# Patient Record
Sex: MALE | Race: AMERICAN INDIAN OR ALASKA NATIVE | NOT HISPANIC OR LATINO | URBAN - METROPOLITAN AREA
[De-identification: names, ages, dates, MRNs, and addresses within clinical notes are randomized per-mention and may not be internally consistent; named-entity substitution may affect disease eponyms.]

---

## 2017-10-12 ENCOUNTER — INPATIENT (INPATIENT)
Facility: HOSPITAL | Age: 57
LOS: 3 days | Discharge: HOME | DRG: 726 | End: 2017-10-16
Attending: INTERNAL MEDICINE | Admitting: INTERNAL MEDICINE
Payer: COMMERCIAL

## 2017-10-12 VITALS
WEIGHT: 136.03 LBS | SYSTOLIC BLOOD PRESSURE: 164 MMHG | RESPIRATION RATE: 18 BRPM | DIASTOLIC BLOOD PRESSURE: 87 MMHG | HEIGHT: 65 IN | HEART RATE: 88 BPM | OXYGEN SATURATION: 99 % | TEMPERATURE: 98 F

## 2017-10-12 DIAGNOSIS — R33.9 RETENTION OF URINE, UNSPECIFIED: ICD-10-CM

## 2017-10-12 DIAGNOSIS — Z29.9 ENCOUNTER FOR PROPHYLACTIC MEASURES, UNSPECIFIED: ICD-10-CM

## 2017-10-12 DIAGNOSIS — R79.89 OTHER SPECIFIED ABNORMAL FINDINGS OF BLOOD CHEMISTRY: ICD-10-CM

## 2017-10-12 DIAGNOSIS — D64.9 ANEMIA, UNSPECIFIED: ICD-10-CM

## 2017-10-12 DIAGNOSIS — I10 ESSENTIAL (PRIMARY) HYPERTENSION: ICD-10-CM

## 2017-10-12 DIAGNOSIS — N17.9 ACUTE KIDNEY FAILURE, UNSPECIFIED: ICD-10-CM

## 2017-10-12 DIAGNOSIS — E87.1 HYPO-OSMOLALITY AND HYPONATREMIA: ICD-10-CM

## 2017-10-12 LAB
ALBUMIN SERPL ELPH-MCNC: 3.6 G/DL — SIGNIFICANT CHANGE UP (ref 3.5–5)
ALP SERPL-CCNC: 77 U/L — SIGNIFICANT CHANGE UP (ref 40–120)
ALT FLD-CCNC: 38 U/L DA — SIGNIFICANT CHANGE UP (ref 10–60)
ANION GAP SERPL CALC-SCNC: 10 MMOL/L — SIGNIFICANT CHANGE UP (ref 5–17)
ANION GAP SERPL CALC-SCNC: 7 MMOL/L — SIGNIFICANT CHANGE UP (ref 5–17)
APPEARANCE UR: CLEAR — SIGNIFICANT CHANGE UP
AST SERPL-CCNC: 25 U/L — SIGNIFICANT CHANGE UP (ref 10–40)
BILIRUB SERPL-MCNC: 0.6 MG/DL — SIGNIFICANT CHANGE UP (ref 0.2–1.2)
BILIRUB UR-MCNC: NEGATIVE — SIGNIFICANT CHANGE UP
BUN SERPL-MCNC: 20 MG/DL — HIGH (ref 7–18)
BUN SERPL-MCNC: 21 MG/DL — HIGH (ref 7–18)
CALCIUM SERPL-MCNC: 8.4 MG/DL — SIGNIFICANT CHANGE UP (ref 8.4–10.5)
CALCIUM SERPL-MCNC: 8.6 MG/DL — SIGNIFICANT CHANGE UP (ref 8.4–10.5)
CHLORIDE SERPL-SCNC: 103 MMOL/L — SIGNIFICANT CHANGE UP (ref 96–108)
CHLORIDE SERPL-SCNC: 109 MMOL/L — HIGH (ref 96–108)
CHLORIDE UR-SCNC: 43 MMOL/L — LOW (ref 55–125)
CO2 SERPL-SCNC: 19 MMOL/L — LOW (ref 22–31)
CO2 SERPL-SCNC: 22 MMOL/L — SIGNIFICANT CHANGE UP (ref 22–31)
COLOR SPEC: YELLOW — SIGNIFICANT CHANGE UP
CREAT ?TM UR-MCNC: 20 MG/DL — SIGNIFICANT CHANGE UP
CREAT SERPL-MCNC: 2.65 MG/DL — HIGH (ref 0.5–1.3)
CREAT SERPL-MCNC: 3.12 MG/DL — HIGH (ref 0.5–1.3)
DIFF PNL FLD: NEGATIVE — SIGNIFICANT CHANGE UP
GLUCOSE SERPL-MCNC: 100 MG/DL — HIGH (ref 70–99)
GLUCOSE SERPL-MCNC: 107 MG/DL — HIGH (ref 70–99)
GLUCOSE UR QL: NEGATIVE — SIGNIFICANT CHANGE UP
HCT VFR BLD CALC: 34.5 % — LOW (ref 39–50)
HGB BLD-MCNC: 11 G/DL — LOW (ref 13–17)
KETONES UR-MCNC: NEGATIVE — SIGNIFICANT CHANGE UP
LEUKOCYTE ESTERASE UR-ACNC: NEGATIVE — SIGNIFICANT CHANGE UP
MCHC RBC-ENTMCNC: 24.5 PG — LOW (ref 27–34)
MCHC RBC-ENTMCNC: 32 GM/DL — SIGNIFICANT CHANGE UP (ref 32–36)
MCV RBC AUTO: 76.8 FL — LOW (ref 80–100)
NITRITE UR-MCNC: NEGATIVE — SIGNIFICANT CHANGE UP
OSMOLALITY UR: 136 MOS/KG — SIGNIFICANT CHANGE UP (ref 50–1200)
PH UR: 5 — SIGNIFICANT CHANGE UP (ref 5–8)
PLATELET # BLD AUTO: 189 K/UL — SIGNIFICANT CHANGE UP (ref 150–400)
POTASSIUM SERPL-MCNC: 3.7 MMOL/L — SIGNIFICANT CHANGE UP (ref 3.5–5.3)
POTASSIUM SERPL-MCNC: 4.1 MMOL/L — SIGNIFICANT CHANGE UP (ref 3.5–5.3)
POTASSIUM SERPL-SCNC: 3.7 MMOL/L — SIGNIFICANT CHANGE UP (ref 3.5–5.3)
POTASSIUM SERPL-SCNC: 4.1 MMOL/L — SIGNIFICANT CHANGE UP (ref 3.5–5.3)
PROT ?TM UR-MCNC: 124 MG/DL — HIGH (ref 0–12)
PROT SERPL-MCNC: 8 G/DL — SIGNIFICANT CHANGE UP (ref 6–8.3)
PROT UR-MCNC: NEGATIVE — SIGNIFICANT CHANGE UP
RBC # BLD: 4.49 M/UL — SIGNIFICANT CHANGE UP (ref 4.2–5.8)
RBC # FLD: 15.7 % — HIGH (ref 10.3–14.5)
SODIUM SERPL-SCNC: 132 MMOL/L — LOW (ref 135–145)
SODIUM SERPL-SCNC: 138 MMOL/L — SIGNIFICANT CHANGE UP (ref 135–145)
SODIUM UR-SCNC: 44 MMOL/L — SIGNIFICANT CHANGE UP (ref 40–220)
SP GR SPEC: 1 — LOW (ref 1.01–1.02)
UROBILINOGEN FLD QL: NEGATIVE — SIGNIFICANT CHANGE UP
WBC # BLD: 8.3 K/UL — SIGNIFICANT CHANGE UP (ref 3.8–10.5)
WBC # FLD AUTO: 8.3 K/UL — SIGNIFICANT CHANGE UP (ref 3.8–10.5)

## 2017-10-12 PROCEDURE — 99285 EMERGENCY DEPT VISIT HI MDM: CPT

## 2017-10-12 PROCEDURE — 74176 CT ABD & PELVIS W/O CONTRAST: CPT | Mod: 26

## 2017-10-12 RX ORDER — HYDRALAZINE HCL 50 MG
25 TABLET ORAL ONCE
Qty: 0 | Refills: 0 | Status: COMPLETED | OUTPATIENT
Start: 2017-10-12 | End: 2017-10-12

## 2017-10-12 RX ORDER — FERROUS SULFATE 325(65) MG
325 TABLET ORAL DAILY
Qty: 0 | Refills: 0 | Status: DISCONTINUED | OUTPATIENT
Start: 2017-10-12 | End: 2017-10-13

## 2017-10-12 RX ORDER — TAMSULOSIN HYDROCHLORIDE 0.4 MG/1
0.4 CAPSULE ORAL AT BEDTIME
Qty: 0 | Refills: 0 | Status: DISCONTINUED | OUTPATIENT
Start: 2017-10-12 | End: 2017-10-16

## 2017-10-12 RX ORDER — ASCORBIC ACID 60 MG
500 TABLET,CHEWABLE ORAL DAILY
Qty: 0 | Refills: 0 | Status: DISCONTINUED | OUTPATIENT
Start: 2017-10-12 | End: 2017-10-16

## 2017-10-12 RX ORDER — SODIUM CHLORIDE 9 MG/ML
1000 INJECTION INTRAMUSCULAR; INTRAVENOUS; SUBCUTANEOUS
Qty: 0 | Refills: 0 | Status: DISCONTINUED | OUTPATIENT
Start: 2017-10-12 | End: 2017-10-13

## 2017-10-12 RX ORDER — ATENOLOL 25 MG/1
50 TABLET ORAL DAILY
Qty: 0 | Refills: 0 | Status: DISCONTINUED | OUTPATIENT
Start: 2017-10-12 | End: 2017-10-16

## 2017-10-12 RX ADMIN — Medication 25 MILLIGRAM(S): at 23:02

## 2017-10-12 RX ADMIN — SODIUM CHLORIDE 100 MILLILITER(S): 9 INJECTION INTRAMUSCULAR; INTRAVENOUS; SUBCUTANEOUS at 20:50

## 2017-10-12 RX ADMIN — TAMSULOSIN HYDROCHLORIDE 0.4 MILLIGRAM(S): 0.4 CAPSULE ORAL at 21:09

## 2017-10-12 NOTE — H&P ADULT - ASSESSMENT
56 y/o M pt with a significant PMHx of HTN, anemia, BPH and no significant PSHx referred to ED by pmd for urinary retention

## 2017-10-12 NOTE — H&P ADULT - PROBLEM SELECTOR PLAN 3
patient Hb 11.0 HCT 76.8  - patient having Microcytic anemia   - f/u Iron studies  - will replaced with iron patient Hb 11.0 HCT 76.8  - patient having Microcytic anemia   - f/u Iron studies  - F/u folate and Vit B12  - will replaced with iron

## 2017-10-12 NOTE — PROGRESS NOTE ADULT - SUBJECTIVE AND OBJECTIVE BOX
· Chief Complaint: The patient is a 57y Male complaining of urinary retention	  · HPI Objective Statement: 58 y/o M pt with a significant PMHx of HTN, anemia, BPH and no significant PSHx referred to ED by pmd for urinary retention x yesterday. Pt states he has not seen urologist in over a year and notes that he does not have a urologist at the moment. Pt denies fever, chills, nausea, vomiting, weakness, numbness, saddle anesthesia since symptom onset, or any other complaints. NKDA.	  · Presenting Symptoms: urinary retention	  · Negative Findings: no chills, no fever, no nausea, no vomiting, no weakness, no numbness, no saddle anesthesia	  · Timing: gradual onset	  · Duration: yesterday	  · Aggravating Factors: none	  · Relieving Factors: none	    HIV:    HIV Status:  · Offered: Declined	    PAST MEDICAL/SURGICAL/FAMILY/SOCIAL HISTORY:    Past Medical History:  Anemia    BPH (benign prostatic hyperplasia)    HTN (hypertension).     Past Surgical History:  No significant past surgical history.    · Social Concerns: None	     Tobacco Usage:  · Tobacco Usage	Never smoker	    · Attestation Comment: I have reviewed and confirmed nurses' notes for patient's medications, allergies, medical history, and surgical history.	    ALLERGIES AND HOME MEDICATIONS:   Allergies:        Allergies:  	No Known Allergies:     Home Medications:   * Outpatient Medication Status not yet specified    REVIEW OF SYSTEMS:    Review of Systems:  · CONSTITUTIONAL: - - -	  · Constitutional [-]: no fever, no weight loss	  · CARDIOVASCULAR: - - -	  · Cardiovascular [-]: no chest pain	  · GASTROINTESTINAL: - - -	  · Gastrointestinal [-]: no nausea, no vomiting, no constipation, no fecal incontinence	  · GENITOURINARY: - - -	  · Genitourinary [+]: urinary retention with dribbling	  · NEUROLOGICAL: - - -	  · Neurological [-]: no weakness, no numbness	  · ENDOCRINE: no diabetes and no thyroid trouble.	  · ROS STATEMENT: all other ROS negative except as per HPI	    VITAL SIGNS( Pullset):    ,,ED ADULT Flow Sheet:    12-Oct-2017 12:53	  · Temp (F): 97.8	  · Temp (C) Temp (C): 36.6	  · Temp site Temp Site: oral	  · Heart Rate Heart Rate (beats/min): 88	  · BP Systolic Systolic: 164	  · BP Diastolic Diastolic (mm Hg): 87	  · Respiration Rate (breaths/min) Respiration Rate (breaths/min): 18	  · SpO2 (%) SpO2 (%): 99	  · O2 delivery Patient On: room air	  · Dosing Weight (KILOGRAMS) Dosing Weight (KILOGRAMS): 61.7	  · Dosing Weight  (POUNDS) Dosing Weight (POUNDS): 136	  · Height (FEET) Height (FEET): 5	  · Height (INCHES) Height (INCHES): 5	  · Height (CENTIMETERS) Height (CENTIMETERS): 165.1	  · BSA (m2): 1.68	  · BMI (kG/m2) BMI (kG/m2): 22.6	  · SpO2 (%) SpO2 (%): 99	  · O2 delivery Patient On: room air	  · Preferred Language to Address Healthcare Preferred Language to Address Healthcare: English	    PHYSICAL EXAM:   · CONSTITUTIONAL: aaox3, no acute distress, speaking in complete sentences, cooperative on examination.	  · ENMT: MOM	  · EYES: Clear bilaterally, pupils equal, round and reactive to light. EOMI.	  · CARDIAC: Normal rate, regular rhythm.  Heart sounds S1, S2.  No murmurs, rubs or gallops.	  · RESPIRATORY: Breath sounds clear and equal bilaterally.	  · GASTROINTESTINAL: Suprapubic distention with mild suprapubic tenderness to palpation. Sof and depressible abdomen	  · MUSCULOSKELETAL: Extremities: no cyanosis.	  · NEUROLOGICAL: No gross deficits. No weakness, no numbness, no saddle anesthesia.	    A/P     #Acute urinary retention/Bphdvt/gi prophylaxis-dean placed  -monitor i/os  -f/up labs and electriolytes  -urology f/up    #Acute renal failure-f/up labs  -electrolytes  -nephrology  f/up    # htn-cont current meds  -monotor blood  pressure    #dvt/gi -prophylaxis  -d/w er staff and still awaiting for admittion by staff     #Acute renal failure-f/up

## 2017-10-12 NOTE — H&P ADULT - PROBLEM SELECTOR PLAN 1
patient has h/o of BPH since 3 year  - urinary retention likely due to BPH   - UA negative for Blood or infection  - f/u PSA  - foleys placed, patient having good urine output, no hematuria seen   - started on flomax

## 2017-10-12 NOTE — H&P ADULT - HISTORY OF PRESENT ILLNESS
58 y/o M pt with a significant PMHx of HTN, anemia, BPH and no significant PSHx referred to ED by pmd patient was having lower abdominal pain since 3 days, pain was dull and aching in nature, non radiating, increased with the movement, pain was getting worse over the last 3 days, patient also started to have back pain since yesterday and he was having difficulty in passing urine, but he denies any burning, or dysuria during micturition, patient has seen he is getting swelling over the both legs,  patient had h/o BPH since 3 year and he is taking Flomax daily. Pt states he has not seen urologist in over a year and notes that he does not have a urologist at the moment.  patient had colonoscopy Dec 2106 which showed hemorrhoids.  Pt denies fever, chills, nausea, vomiting, weakness, numbness, saddle anesthesia since symptom onset, or any other complaints. NKDA.

## 2017-10-12 NOTE — ED ADULT NURSE NOTE - OBJECTIVE STATEMENT
Patient presents to ED with frequency, urgency. Sent from PMD for urinary retention x yesterday.  Abdomen is soft, non distended. Suprapubic tenderness upon palpation. Breathing easy and unlabored, no use of accessory muscles. Ambulates with steady gait. Patient presents to ED with frequency, urgency. Sent from PMD for urinary retention x yesterday.  Abdomen is soft, non distended. Suprapubic tenderness upon palpation. Breathing easy and unlabored, no use of accessory muscles. Ambulates with steady gait. Family at bedside.

## 2017-10-12 NOTE — H&P ADULT - FAMILY HISTORY
Father  Still living? Unknown  Family history of heart disease, Age at diagnosis: Age Unknown     Grandparent  Still living? No  Family history of heart disease, Age at diagnosis: Age Unknown

## 2017-10-12 NOTE — ED PROVIDER NOTE - MEDICAL DECISION MAKING DETAILS
57 M pt with a PMHx of BPH presents with urinary retention since yesterday. Pt has no fever, no nausea, no vomiting or any other systemic symptoms. Physical exam reveals suprapubic distention consistent with urinary retention likely due to BPH. Will place Diaz Catheter and obtain lab studies to assess for renal function. Reassess.

## 2017-10-12 NOTE — ED PROVIDER NOTE - NS_ ATTENDINGSCRIBEDETAILS _ED_A_ED_FT
The scribe's documentation has been prepared under my direction and personally reviewed by me in its entirety. I confirm that the note above accurately reflects all work, treatment, procedures, and medical decision-making performed by me.   -[Chato Platt MD]

## 2017-10-12 NOTE — H&P ADULT - PROBLEM SELECTOR PLAN 2
patient denies any h/o of renal disease, as per patient he had normal renal functions  - current Cr 3.12,  BUn 21  - BUN/Cr 6  - UA negative   - likely due to obstructive nephropathy   - foleys catheter placed, patient having good urine output   - f/u CT abdomen and Plevis  - f/u urine lytes  - Nephro consult   - patient denies any h/o of renal disease, as per patient he had normal renal functions  - current Cr 3.12,  BUn 21  - BUN/Cr 6  - UA negative   - likely due to obstructive nephropathy   - foleys catheter placed, patient having good urine output   - f/u CT abdomen and Plevis  - f/u urine lytes  - Nephro consult Dr Brand   - urology consulted patient denies any h/o of renal disease, as per patient he had normal renal functions  - current Cr 3.12,  BUn 21  - BUN/Cr 6  - UA negative   - likely due to obstructive nephropathy   - foleys catheter placed, patient having good urine output   - f/u CT abdomen and Plevis  - f/u urine lytes  - Nephro consult Dr Brand   - urology consulted Cathy Foley

## 2017-10-12 NOTE — ED PROVIDER NOTE - OBJECTIVE STATEMENT
58 y/o M pt with a significant PMHx of HTN, anemia, BPH and no significant PSHx referred to ED by pmd for urinary retention x yesterday. Pt states he has not seen urologist in over a year and notes that he does not have a urologist at the moment. Pt denies fever, chills, nausea, vomiting, weakness, numbness, saddle anesthesia since symptom onset, or any other complaints. NKDA.

## 2017-10-12 NOTE — H&P ADULT - NSHPLABSRESULTS_GEN_ALL_CORE
CBC Full  -  ( 12 Oct 2017 15:30 )  WBC Count : 8.3 K/uL  Hemoglobin : 11.0 g/dL  Hematocrit : 34.5 %  Platelet Count - Automated : 189 K/uL  Mean Cell Volume : 76.8 fl  Mean Cell Hemoglobin : 24.5 pg  Mean Cell Hemoglobin Concentration : 32.0 gm/dL    10-12    132<L>  |  103  |  21<H>  ----------------------------<  107<H>  4.1   |  22  |  3.12<H>    Ca    8.6      12 Oct 2017 15:30    TPro  8.0  /  Alb  3.6  /  TBili  0.6  /  DBili  x   /  AST  25  /  ALT  38  /  AlkPhos  77  10-12

## 2017-10-13 LAB
24R-OH-CALCIDIOL SERPL-MCNC: 34.5 NG/ML — SIGNIFICANT CHANGE UP (ref 30–80)
ANION GAP SERPL CALC-SCNC: 10 MMOL/L — SIGNIFICANT CHANGE UP (ref 5–17)
BUN SERPL-MCNC: 16 MG/DL — SIGNIFICANT CHANGE UP (ref 7–18)
CALCIUM SERPL-MCNC: 8.3 MG/DL — LOW (ref 8.4–10.5)
CHLORIDE SERPL-SCNC: 111 MMOL/L — HIGH (ref 96–108)
CHOLEST SERPL-MCNC: 108 MG/DL — SIGNIFICANT CHANGE UP (ref 10–199)
CO2 SERPL-SCNC: 20 MMOL/L — LOW (ref 22–31)
CREAT SERPL-MCNC: 2.05 MG/DL — HIGH (ref 0.5–1.3)
FERRITIN SERPL-MCNC: 35 NG/ML — SIGNIFICANT CHANGE UP (ref 30–400)
FOLATE SERPL-MCNC: 18.7 NG/ML — SIGNIFICANT CHANGE UP (ref 4.8–24.2)
GLUCOSE SERPL-MCNC: 100 MG/DL — HIGH (ref 70–99)
HBA1C BLD-MCNC: 5.8 % — HIGH (ref 4–5.6)
HCT VFR BLD CALC: 29.4 % — LOW (ref 39–50)
HCV AB S/CO SERPL IA: 0.1 S/CO — SIGNIFICANT CHANGE UP
HCV AB SERPL-IMP: SIGNIFICANT CHANGE UP
HDLC SERPL-MCNC: 57 MG/DL — SIGNIFICANT CHANGE UP (ref 40–125)
HGB BLD-MCNC: 9.4 G/DL — LOW (ref 13–17)
HIV 1+2 AB+HIV1 P24 AG SERPL QL IA: SIGNIFICANT CHANGE UP
IRON SATN MFR SERPL: 14 % — LOW (ref 20–55)
IRON SATN MFR SERPL: 45 UG/DL — LOW (ref 65–170)
LIPID PNL WITH DIRECT LDL SERPL: 38 MG/DL — SIGNIFICANT CHANGE UP
MAGNESIUM SERPL-MCNC: 2 MG/DL — SIGNIFICANT CHANGE UP (ref 1.6–2.6)
MCHC RBC-ENTMCNC: 24.6 PG — LOW (ref 27–34)
MCHC RBC-ENTMCNC: 31.9 GM/DL — LOW (ref 32–36)
MCV RBC AUTO: 77 FL — LOW (ref 80–100)
PHOSPHATE SERPL-MCNC: 3.6 MG/DL — SIGNIFICANT CHANGE UP (ref 2.5–4.5)
PLATELET # BLD AUTO: 159 K/UL — SIGNIFICANT CHANGE UP (ref 150–400)
POTASSIUM SERPL-MCNC: 3.9 MMOL/L — SIGNIFICANT CHANGE UP (ref 3.5–5.3)
POTASSIUM SERPL-SCNC: 3.9 MMOL/L — SIGNIFICANT CHANGE UP (ref 3.5–5.3)
PSA FLD-MCNC: 1.44 NG/ML — SIGNIFICANT CHANGE UP (ref 0–4)
RBC # BLD: 3.82 M/UL — LOW (ref 4.2–5.8)
RBC # FLD: 16.2 % — HIGH (ref 10.3–14.5)
SODIUM SERPL-SCNC: 141 MMOL/L — SIGNIFICANT CHANGE UP (ref 135–145)
TIBC SERPL-MCNC: 322 UG/DL — SIGNIFICANT CHANGE UP (ref 250–450)
TOTAL CHOLESTEROL/HDL RATIO MEASUREMENT: 1.9 RATIO — LOW (ref 3.4–9.6)
TRANSFERRIN SERPL-MCNC: 246 MG/DL — SIGNIFICANT CHANGE UP (ref 200–360)
TRIGL SERPL-MCNC: 67 MG/DL — SIGNIFICANT CHANGE UP (ref 10–149)
TSH SERPL-MCNC: 0.68 UU/ML — SIGNIFICANT CHANGE UP (ref 0.34–4.82)
UIBC SERPL-MCNC: 277 UG/DL — SIGNIFICANT CHANGE UP (ref 110–370)
VIT B12 SERPL-MCNC: >2000 PG/ML — HIGH (ref 243–894)
WBC # BLD: 5.6 K/UL — SIGNIFICANT CHANGE UP (ref 3.8–10.5)
WBC # FLD AUTO: 5.6 K/UL — SIGNIFICANT CHANGE UP (ref 3.8–10.5)

## 2017-10-13 RX ORDER — SODIUM CHLORIDE 9 MG/ML
1000 INJECTION, SOLUTION INTRAVENOUS
Qty: 0 | Refills: 0 | Status: DISCONTINUED | OUTPATIENT
Start: 2017-10-13 | End: 2017-10-16

## 2017-10-13 RX ORDER — FERROUS SULFATE 325(65) MG
325 TABLET ORAL
Qty: 0 | Refills: 0 | Status: DISCONTINUED | OUTPATIENT
Start: 2017-10-13 | End: 2017-10-16

## 2017-10-13 RX ADMIN — Medication 500 MILLIGRAM(S): at 11:09

## 2017-10-13 RX ADMIN — Medication 325 MILLIGRAM(S): at 18:25

## 2017-10-13 RX ADMIN — Medication 325 MILLIGRAM(S): at 11:09

## 2017-10-13 RX ADMIN — TAMSULOSIN HYDROCHLORIDE 0.4 MILLIGRAM(S): 0.4 CAPSULE ORAL at 22:02

## 2017-10-13 RX ADMIN — SODIUM CHLORIDE 75 MILLILITER(S): 9 INJECTION, SOLUTION INTRAVENOUS at 14:09

## 2017-10-13 RX ADMIN — ATENOLOL 50 MILLIGRAM(S): 25 TABLET ORAL at 05:26

## 2017-10-13 NOTE — CONSULT NOTE ADULT - ASSESSMENT
Post renal CIPRIANO - bladder neck obstruction with BPH.  Non oliguric  BUN /creatinine improved.  Increased diuresis post obstruction due to acquired tubular dysfunction.    Will continue IV FLUIDS 1/2 ns rate 75 lm/minute  Daily BMP.  2 gm sodium diet.  Urology followup

## 2017-10-13 NOTE — CONSULT NOTE ADULT - PROBLEM SELECTOR RECOMMENDATION 9
1) continue dean  2) continue flomax  3)f/u PSA  4) monitor creatinine   5) case and plan discussed with Dr. Morgan and agrees

## 2017-10-13 NOTE — PROGRESS NOTE ADULT - SUBJECTIVE AND OBJECTIVE BOX
NP Note discussed with  Primary Attending    Patient is a 57y old  Male who presents with a chief complaint of lower abdominal pain along with back pain and difficulty to pass urine (12 Oct 2017 19:15)      INTERVAL HPI/OVERNIGHT EVENTS: no new complaints    MEDICATIONS  (STANDING):  ascorbic acid 500 milliGRAM(s) Oral daily  ATENolol  Tablet 50 milliGRAM(s) Oral daily  ferrous    sulfate 325 milliGRAM(s) Oral two times a day with meals  sodium chloride 0.45%. 1000 milliLiter(s) (75 mL/Hr) IV Continuous <Continuous>  tamsulosin 0.4 milliGRAM(s) Oral at bedtime    MEDICATIONS  (PRN):      __________________________________________________  REVIEW OF SYSTEMS:    CONSTITUTIONAL: No fever,   EYES: no acute visual disturbances  NECK: No pain or stiffness  RESPIRATORY: No cough; No shortness of breath  CARDIOVASCULAR: No chest pain, no palpitations  GASTROINTESTINAL: No pain. No nausea or vomiting; No diarrhea   NEUROLOGICAL: No headache or numbness, no tremors  MUSCULOSKELETAL: No joint pain, no muscle pain  GENITOURINARY: no dysuria, no frequency, no hesitancy  PSYCHIATRY: no depression , no anxiety  ALL OTHER  ROS negative        Vital Signs Last 24 Hrs  T(C): 36.8 (13 Oct 2017 14:20), Max: 37.3 (12 Oct 2017 20:09)  T(F): 98.3 (13 Oct 2017 14:20), Max: 99.1 (12 Oct 2017 20:09)  HR: 68 (13 Oct 2017 14:20) (65 - 80)  BP: 142/75 (13 Oct 2017 14:20) (128/69 - 166/94)  BP(mean): 97 (13 Oct 2017 14:20) (97 - 97)  RR: 16 (13 Oct 2017 14:20) (14 - 18)  SpO2: 100% (13 Oct 2017 14:20) (100% - 100%)    ________________________________________________  PHYSICAL EXAM:  GENERAL: NAD  HEENT: Normocephalic;  conjunctivae and sclerae clear; moist mucous membranes;   NECK : supple  CHEST/LUNG: Clear to auscultation bilaterally with good air entry   HEART: S1 S2  regular; no murmurs, gallops or rubs  ABDOMEN: Soft, Nontender, Nondistended; Bowel sounds present  EXTREMITIES: no cyanosis; no edema; no calf tenderness  SKIN: warm and dry; no rash  NERVOUS SYSTEM:  Awake and alert; Oriented  to place, person and time ; no new deficits    _________________________________________________  LABS:                        9.4    5.6   )-----------( 159      ( 13 Oct 2017 07:30 )             29.4     10-    141  |  111<H>  |  16  ----------------------------<  100<H>  3.9   |  20<L>  |  2.05<H>    Ca    8.3<L>      13 Oct 2017 07:30  Phos  3.6     10-  Mg     2.0     10-    TPro  8.0  /  Alb  3.6  /  TBili  0.6  /  DBili  x   /  AST  25  /  ALT  38  /  AlkPhos  77  10-12      Urinalysis Basic - ( 12 Oct 2017 16:22 )    Color: Yellow / Appearance: Clear / S.005 / pH: x  Gluc: x / Ketone: Negative  / Bili: Negative / Urobili: Negative   Blood: x / Protein: Negative / Nitrite: Negative   Leuk Esterase: Negative / RBC: x / WBC x   Sq Epi: x / Non Sq Epi: x / Bacteria: x      CAPILLARY BLOOD GLUCOSE            RADIOLOGY & ADDITIONAL TESTS:    Imaging Personally Reviewed:  YES    Consultant(s) Notes Reviewed:   YES    Care Discussed with Consultants :     Plan of care was discussed with patient and /or primary care giver; all questions and concerns were addressed and care was aligned with patient's wishes.

## 2017-10-13 NOTE — PROGRESS NOTE ADULT - PROBLEM SELECTOR PLAN 1
Urinary retention, likely due to BPH  - Dean placed in ED with adequate clear, yellow urine output  - UA negative for Blood or infection  - PSA pending  - Pt on Flomax  - Dr. Morgan, urology, consulted, and recommended to  followup on PSA, monitor creatinine, and to continue with   dean and flomax.

## 2017-10-13 NOTE — PROGRESS NOTE ADULT - PROBLEM SELECTOR PLAN 5
Microcytic Anemia.    - Hb 11.0 HCT 76.8  - guiac pending  - Iron saturation and total iron decreased and pt  supplemented with ferrous sulfate  - folate and Vit B12 normal

## 2017-10-13 NOTE — CONSULT NOTE ADULT - SUBJECTIVE AND OBJECTIVE BOX
Patient is a 57y Male whom    56 y/o M pt with a history of   BPH and was placed on Flomax few weeks ago.  Lately he started to have urinary difficulties on and off. He saw the urology 2 weeks ago at that visit the urology placed a catheter in his urethra.  Patient says that since that time his urine output started to falculate and  he went back to his PMD abd was told to have UTI and was placed on PO antibiotics.  In the last  3 days before admission his urine out put decreased significantly and he developed back pain, leg edema and increased in abdominal girth .He went to see his PMD who sent him to the ER where he was found to have bladder neck obstruction.  He denies dysuria, fever and chills.      Last Colonoscopy was in 2016 and was negative.    He is not taking NSAID or pain medications.    PAST MEDICAL & SURGICAL HISTORY:  Anemia  BPH (benign prostatic hyperplasia)  HTN (hypertension)      Hospital Medications:   MEDICATIONS  (STANDING):  ascorbic acid 500 milliGRAM(s) Oral daily  ATENolol  Tablet 50 milliGRAM(s) Oral daily  ferrous    sulfate 325 milliGRAM(s) Oral daily  sodium chloride 0.9%. 1000 milliLiter(s) (100 mL/Hr) IV Continuous <Continuous>  tamsulosin 0.4 milliGRAM(s) Oral at bedtime    MEDICATIONS  (PRN):      Allergies    No Known Allergies    Intolerances    NO ETOH AND NOT A SMOKER.  Admission lab;  Comprehensive Metabolic Panel (10.12.17 @ 15:30)    Sodium, Serum: 132 mmol/L    Potassium, Serum: 4.1 mmol/L    Chloride, Serum: 103 mmol/L    Carbon Dioxide, Serum: 22 mmol/L    Anion Gap, Serum: 7 mmol/L    Blood Urea Nitrogen, Serum: 21 mg/dL    Creatinine, Serum: 3.12 mg/dL    Glucose, Serum: 107 mg/dL    Calcium, Total Serum: 8.6 mg/dL    Protein Total, Serum: 8.0 g/dL    Albumin, Serum: 3.6 g/dL    Bilirubin Total, Serum: 0.6 mg/dL    Alkaline Phosphatase, Serum: 77 U/L    Aspartate Aminotransferase (AST/SGOT): 25 U/L    Alanine Aminotransferase (ALT/SGPT): 38 U/L DA    eGFR if Non : 21: Interpretative comment                          9.4    5.6   )-----------( 159      ( 13 Oct 2017 07:30 )             29.4     10-    141  |  111<H>  |  16  ----------------------------<  100<H>  3.9   |  20<L>  |  2.05<H>    Ca    8.3<L>      13 Oct 2017 07:30  Phos  3.6     10  Mg     2.0     10-13    TPro  8.0  /  Alb  3.6  /  TBili  0.6  /  DBili  x   /  AST  25  /  ALT  38  /  AlkPhos  77  10      Urinalysis Basic - ( 12 Oct 2017 16:22 )    Color: Yellow / Appearance: Clear / S.005 / pH: x  Gluc: x / Ketone: Negative  / Bili: Negative / Urobili: Negative   Blood: x / Protein: Negative / Nitrite: Negative   Leuk Esterase: Negative / RBC: x / WBC x   Sq Epi: x / Non Sq Epi: x / Bacteria: x      Sodium, Random Urine: 44 mmol/L (10-12 @ 19:02)  Osmolality, Random Urine: 136 mos/kg (10-12 @ 19:02)  Chloride, Random Urine: 43 mmol/L (10-12 @ 19:02)  Creatinine, Random Urine: 20 mg/dL (10-12 @ 19:02)    RADIOLOGY & ADDITIONAL STUDIES:  < from: CT Abdomen and Pelvis No Cont (10.12.17 @ 19:54) >  LOWER CHEST: Within normal limits.    Please at that evaluation of the abdominal organs is somewhat limited   without intravenous contrast  LIVER: Subcentimeter hypodensity within the left hepatic lobe, too small   to characterize  BILE DUCTS: Normal caliber.  GALLBLADDER: Within normal limits.  SPLEEN: Within normal limits.  PANCREAS: Within normal limits.  ADRENALS: Within normal limits.  KIDNEYS/URETERS: Mild bilateral hydroureteronephrosis    BLADDER: Marked urinary bladder wall thickening. Urinary bladder is   underdistended and a Diaz catheter is seen within the bladder.  REPRODUCTIVE ORGANS: Within normal limits.    BOWEL: No bowel obstruction. Appendix is normal  PERITONEUM: Trace free fluid in the pelvis.  VESSELS:  Mild atherosclerotic changes of the aorta and branching vessels  RETROPERITONEUM: No lymphadenopathy.    ABDOMINAL WALL: Tiny fat-containing umbilical hernia.  BONES: Within normal limits.    IMPRESSION:     Mild bilateral hydroureteronephrosis with marked diffuse urinary bladder   wall thickening. Urinary bladder is incompletely collapsed around a Diaz   catheter. Correlate with urinalysis and cystoscopy if clinically   indicated.        SOCIAL HISTORY: Denies ETOh,Smoking,     FAMILY HISTORY:  Family history of heart disease (Father, Grandparent)      REVIEW OF SYSTEMS:  CONSTITUTIONAL: No malaise, No fatigue, No fevers or chills, well developed, no diaphoresis  EYES/ENT: No visual changes;  No vertigo or throat pain   NECK: No pain or stiffness  RESPIRATORY: No cough, wheezing, hemoptysis; No shortness of breath  CARDIOVASCULAR: No chest pain or palpitations. No edema  GASTROINTESTINAL: No abdominal or epigastric pain. No nausea, vomiting, or hematemesis; No diarrhea or constipation. No melena or hematochezia.  GENITOURINARY: as above , feels better cassi with no abdominal or back pain.  NEUROLOGICAL: No numbness or weakness, No tremor  SKIN: No itching, burning, rashes, or lesions    VITALS:  Vital Signs Last 24 Hrs  T(C): 36.9 (13 Oct 2017 05:23), Max: 37.3 (12 Oct 2017 20:09)  T(F): 98.5 (13 Oct 2017 05:23), Max: 99.1 (12 Oct 2017 20:09)  HR: 79 (13 Oct 2017 05:23) (65 - 88)  BP: 128/69 (13 Oct 2017 05:23) (128/69 - 166/94)  BP(mean): --  RR: 15 (13 Oct 2017 05:23) (14 - 18)  SpO2: 100% (13 Oct 2017 05:23) (99% - 100%)    10-12 @ 07:01  -  10-13 @ 07:00  --------------------------------------------------------  IN: 0 mL / OUT: 3600 mL / NET: -3600 mL      Height (cm): 165.1 (10-12 @ 12:53)  Weight (kg): 61.158501450272807 (10-12 @ 12:53)  BMI (kg/m2): 22.6 (10-12 @ 12:53)  BSA (m2): 1.68 (10-12 @ 12:53)    PHYSICAL EXAM:  Constitutional: NAD  HEENT: anicteric sclera, oropharynx clear, MMM  Neck: No JVD  Respiratory: good air entrance B/L, no wheezes, rales or rhonchi  Cardiovascular: S1, S2, RRR, no pericardial rub, no murmur  Gastrointestinal: BS+, soft, no tenderness, no distension, no bruit  Pelvis: bladder non-distended, no CVA tenderness  Extremities: No cyanosis or clubbing. No peripheral edema  Neurological: A/O x 3, no focal deficits  Psychiatric: Normal mood, normal affect  Skin: No rashes  Vascular: all pulses present
57y Male with PMHx of BPH presents with difficulty urinating for a couple of months and has progressively gotten worse. He developed abdominal discomfort and cirilo leg swelling and decided to go to his PCP who instructed him to come to ED. Patient reports seeing a urologist about a year ago but has not followed up recently. He denies fevers, no nausea or vomiting, no hematuria, no dysuria.  Discussed prostate measurements with radiology. Prostate measures 4.1x4.5x3.3cm(30grams).    pmhx: as above  pshx: none    meds:  ascorbic acid 500 milliGRAM(s) Oral daily  ATENolol  Tablet 50 milliGRAM(s) Oral daily  ferrous    sulfate 325 milliGRAM(s) Oral daily  sodium chloride 0.9%. 1000 milliLiter(s) IV Continuous <Continuous>  tamsulosin 0.4 milliGRAM(s) Oral at bedtime    No Known Allergies    Gen:A&O x3    vitals:  T(C): 36.9 (10-13-17 @ 05:23), Max: 37.3 (10-12-17 @ 20:09)  HR: 79 (10-13-17 @ 05:23) (65 - 88)  BP: 128/69 (10-13-17 @ 05:23) (128/69 - 166/94)  RR: 15 (10-13-17 @ 05:23) (14 - 18)  SpO2: 100% (10-13-17 @ 05:23) (99% - 100%)      Abdomen: soft, nontender, nondistended, no masses, no guarding, no rebound tenderness  : normal external genatalia, no suprapubic tenderness, dean catheter in place with clear urine  Lower Extremities: no edema, no skin discoloration, nontender, warm extremities cirilo, no ulcers, palpable pedal pulses    dean: 1800mL/24hrs    10-12 @ 07:01  -  10-13 @ 07:00  --------------------------------------------------------  IN: 0 mL / OUT: 3600 mL / NET: -3600 mL      < from: CT Abdomen and Pelvis No Cont (10.12.17 @ 19:54) >  IMPRESSION:     Mild bilateral hydroureteronephrosis with marked diffuse urinary bladder   wall thickening. Urinary bladder is incompletely collapsed around a Dean   catheter. Correlate with urinalysis and cystoscopy if clinically   indicated.    < end of copied text >  < from: CT Abdomen and Pelvis No Cont (10.12.17 @ 19:54) >  IMPRESSION:     Mild bilateral hydroureteronephrosis with marked diffuse urinary bladder   wall thickening. Urinary bladder is incompletely collapsed around a Dean   catheter. Correlate with urinalysis and cystoscopy if clinically   indicated.    < end of copied text >

## 2017-10-13 NOTE — PROGRESS NOTE ADULT - PROBLEM SELECTOR PLAN 4
Post renal CIPRIANO - bladder neck obstruction with BPH  Patient denied any history of renal disease, and has normal renal function  at baseline.  - current Cr 3.12/BUN 21  - UA negative   - f/u CT abdomen and Plevis  - f/u urine lytes  - Dr. Brand, nephro, consulted and noted increased diuresis post  obstruction due to acquired tubular dysfunction.  She started pt on  1/2 NS at 75 ml/hr, ordered daily BMP's, 2 gm sodium diet.  - Dr. Morgan, urology, consulted

## 2017-10-13 NOTE — PROGRESS NOTE ADULT - ASSESSMENT
56 y/o male pt with a significant PMHx of HTN, anemia, BPH and no significant PSHx referred to ED by pmd for lower abdominal pain since 3 days.  Pt described pain as dull and aching in nature, non radiating, increased with the movement.  The pain was getting worse over the last 3 days.  Patient also started to have back pain since yesterday and he was having difficulty in passing urine, but denied any burning, or dysuria during micturition.  Patient also noted swelling of his legs.  Patient had h/o BPH for 3 years, and he is taking Flomax daily. Pt stated he has not seen urologist in over a year and noted that he does not have a urologist at this time.  Patient had colonoscopy Dec 2106 which showed hemorrhoids.  Pt denied fever, chills, nausea, vomiting, weakness, numbness, saddle anesthesia since symptom onset, or any other complaints.

## 2017-10-14 LAB
ANION GAP SERPL CALC-SCNC: 11 MMOL/L — SIGNIFICANT CHANGE UP (ref 5–17)
BASOPHILS # BLD AUTO: 0 K/UL — SIGNIFICANT CHANGE UP (ref 0–0.2)
BASOPHILS NFR BLD AUTO: 0.7 % — SIGNIFICANT CHANGE UP (ref 0–2)
BUN SERPL-MCNC: 11 MG/DL — SIGNIFICANT CHANGE UP (ref 7–18)
CALCIUM SERPL-MCNC: 8.5 MG/DL — SIGNIFICANT CHANGE UP (ref 8.4–10.5)
CHLORIDE SERPL-SCNC: 107 MMOL/L — SIGNIFICANT CHANGE UP (ref 96–108)
CO2 SERPL-SCNC: 24 MMOL/L — SIGNIFICANT CHANGE UP (ref 22–31)
CREAT SERPL-MCNC: 1.59 MG/DL — HIGH (ref 0.5–1.3)
EOSINOPHIL # BLD AUTO: 0.1 K/UL — SIGNIFICANT CHANGE UP (ref 0–0.5)
EOSINOPHIL NFR BLD AUTO: 2.3 % — SIGNIFICANT CHANGE UP (ref 0–6)
GLUCOSE SERPL-MCNC: 101 MG/DL — HIGH (ref 70–99)
HCT VFR BLD CALC: 32.7 % — LOW (ref 39–50)
HGB BLD-MCNC: 10.3 G/DL — LOW (ref 13–17)
LYMPHOCYTES # BLD AUTO: 1.6 K/UL — SIGNIFICANT CHANGE UP (ref 1–3.3)
LYMPHOCYTES # BLD AUTO: 32.8 % — SIGNIFICANT CHANGE UP (ref 13–44)
MCHC RBC-ENTMCNC: 24.3 PG — LOW (ref 27–34)
MCHC RBC-ENTMCNC: 31.4 GM/DL — LOW (ref 32–36)
MCV RBC AUTO: 77.5 FL — LOW (ref 80–100)
MONOCYTES # BLD AUTO: 0.6 K/UL — SIGNIFICANT CHANGE UP (ref 0–0.9)
MONOCYTES NFR BLD AUTO: 12 % — SIGNIFICANT CHANGE UP (ref 2–14)
NEUTROPHILS # BLD AUTO: 2.6 K/UL — SIGNIFICANT CHANGE UP (ref 1.8–7.4)
NEUTROPHILS NFR BLD AUTO: 52.2 % — SIGNIFICANT CHANGE UP (ref 43–77)
PLATELET # BLD AUTO: 176 K/UL — SIGNIFICANT CHANGE UP (ref 150–400)
POTASSIUM SERPL-MCNC: 3.8 MMOL/L — SIGNIFICANT CHANGE UP (ref 3.5–5.3)
POTASSIUM SERPL-SCNC: 3.8 MMOL/L — SIGNIFICANT CHANGE UP (ref 3.5–5.3)
RBC # BLD: 4.22 M/UL — SIGNIFICANT CHANGE UP (ref 4.2–5.8)
RBC # FLD: 16.1 % — HIGH (ref 10.3–14.5)
SODIUM SERPL-SCNC: 142 MMOL/L — SIGNIFICANT CHANGE UP (ref 135–145)
WBC # BLD: 5 K/UL — SIGNIFICANT CHANGE UP (ref 3.8–10.5)
WBC # FLD AUTO: 5 K/UL — SIGNIFICANT CHANGE UP (ref 3.8–10.5)

## 2017-10-14 RX ORDER — SODIUM CHLORIDE 9 MG/ML
1000 INJECTION, SOLUTION INTRAVENOUS
Qty: 0 | Refills: 0 | Status: CANCELLED | OUTPATIENT
Start: 2018-09-12 | End: 2017-10-16

## 2017-10-14 RX ADMIN — Medication 500 MILLIGRAM(S): at 11:59

## 2017-10-14 RX ADMIN — ATENOLOL 50 MILLIGRAM(S): 25 TABLET ORAL at 07:19

## 2017-10-14 RX ADMIN — Medication 325 MILLIGRAM(S): at 09:49

## 2017-10-14 RX ADMIN — TAMSULOSIN HYDROCHLORIDE 0.4 MILLIGRAM(S): 0.4 CAPSULE ORAL at 22:12

## 2017-10-14 RX ADMIN — Medication 325 MILLIGRAM(S): at 17:20

## 2017-10-14 RX ADMIN — Medication 2.5 MILLIGRAM(S): at 20:39

## 2017-10-14 NOTE — PROGRESS NOTE ADULT - SUBJECTIVE AND OBJECTIVE BOX
58 y/o M pt with a history of   BPH and was placed on Flomax few weeks ago.  Lately he started to have urinary difficulties on and off. He saw the urology 2 weeks ago at that visit the urology placed a catheter in his urethra.  Patient says that since that time his urine output started to falculate and  he went back to his PMD abd was told to have UTI and was placed on PO antibiotics.  In the last  3 days before admission his urine out put decreased significantly and he developed back pain, leg edema and increased in abdominal girth .He went to see his PMD who sent him to the ER where he was found to have bladder neck obstruction.  Patient was seen by Urology team yesterday.      PAST MEDICAL & SURGICAL HISTORY:  Anemia  BPH (benign prostatic hyperplasia)  HTN (hypertension)  No significant past surgical history    No Known Allergies      MEDICATIONS  (STANDING):  ascorbic acid 500 milliGRAM(s) Oral daily  ATENolol  Tablet 50 milliGRAM(s) Oral daily  ferrous    sulfate 325 milliGRAM(s) Oral two times a day with meals  sodium chloride 0.45%. 1000 milliLiter(s) (75 mL/Hr) IV Continuous <Continuous>  tamsulosin 0.4 milliGRAM(s) Oral at bedtime    MEDICATIONS  (PRN):                         10.3   5.0   )-----------( 176      ( 14 Oct 2017 07:21 )             32.7     10-14    142  |  107  |  11  ----------------------------<  101<H>  3.8   |  24  |  1.59<H>    Ca    8.5      14 Oct 2017 07:21  Phos  3.6     10-13  Mg     2.0     10-13    TPro  8.0  /  Alb  3.6  /  TBili  0.6  /  DBili  x   /  AST  25  /  ALT  38  /  AlkPhos  77  10-12        Sodium, Random Urine: 44 mmol/L (10-12 @ 19:02)  Osmolality, Random Urine: 136 mos/kg (10-12 @ 19:02)  Chloride, Random Urine: 43 mmol/L (10-12 @ 19:02)  Creatinine, Random Urine: 20 mg/dL (10-12 @ 19:02)      REVIEW OF SYSTEMS:  General: no fever no chills, no weight loss.  EYES/ENT: No visual changes;  No vertigo, no headache.  NECK: No pain or stiffness  RESPIRATORY: No cough, wheezing, hemoptysis; No shortness of breath  CARDIOVASCULAR: No chest pain or palpitations. No Edema  GASTROINTESTINAL: No abdominal or epigastric pain. No nausea, vomiting. No diarrhea or constipation. No melena.  GENITOURINARY: dean catheter clear urine.  NEUROLOGICAL: No numbness or weakness, no tremor , no dizziness.   Muscle skeletal : no joint pain and no swelling of joints and limbs.          VITALS:  T(F): 98.6 (10-14-17 @ 05:09), Max: 98.6 (10-14-17 @ 05:09)  HR: 70 (10-14-17 @ 05:09)  BP: 136/75 (10-14-17 @ 05:09)  RR: 15 (10-14-17 @ 05:09)  SpO2: 97% (10-14-17 @ 05:09)  Wt(kg): --    10-13 @ 07:01  -  10-14 @ 07:00  --------------------------------------------------------  IN: 1275 mL / OUT: 4000 mL / NET: -2725 mL        PHYSICAL EXAM:  Constitutional: well developed, no diaphoresis, no distress.  Neck: No JVD, no carotid bruit, supple, no adenopathy  Respiratory: Good air entrance B/L, no wheezes, rales or rhonchi  Cardiovascular: S1, S2, RRR, no pericardial rub, no murmur  Abdomen: BS+, soft, no tenderness, no bruit  Pelvis: bladder nondistended with dean catheter  Extremities: No cyanosis or clubbing. No peripheral edema.   Pulses: All present  Neurological: A/O x 3, no focal deficits  Psychiatric: Normal mood, normal affect

## 2017-10-14 NOTE — PROGRESS NOTE ADULT - SUBJECTIVE AND OBJECTIVE BOX
INTERVAL HPI/OVERNIGHT EVENTS:no acute events,afebrile ,doing better    VITAL SIGNS:  T(F): 98.6 (10-14-17 @ 05:09)  HR: 70 (10-14-17 @ 05:09)  BP: 136/75 (10-14-17 @ 05:09)  RR: 15 (10-14-17 @ 05:09)  SpO2: 97% (10-14-17 @ 05:09)  Wt(kg): --    PHYSICAL EXAM:awake,alert    Constitutional:  Eyes:  ENMT:perrla  Neck:  Respiratory:clear  Cardiovascular:s1 s2,m-none  Gastrointestinal:soft,non-tender  Extremities:  Vascular:  Neurological:  Musculoskeletal:    MEDICATIONS  (STANDING):  ascorbic acid 500 milliGRAM(s) Oral daily  ATENolol  Tablet 50 milliGRAM(s) Oral daily  ferrous    sulfate 325 milliGRAM(s) Oral two times a day with meals  sodium chloride 0.45%. 1000 milliLiter(s) (75 mL/Hr) IV Continuous <Continuous>  tamsulosin 0.4 milliGRAM(s) Oral at bedtime    MEDICATIONS  (PRN):      Allergies    No Known Allergies    Intolerances        LABS:                        10.3   5.0   )-----------( 176      ( 14 Oct 2017 07:21 )             32.7     10-14    142  |  107  |  11  ----------------------------<  101<H>  3.8   |  24  |  1.59<H>    Ca    8.5      14 Oct 2017 07:21  Phos  3.6     10-13  Mg     2.0     10-13    TPro  8.0  /  Alb  3.6  /  TBili  0.6  /  DBili  x   /  AST  25  /  ALT  38  /  AlkPhos  77  10-12      Urinalysis Basic - ( 12 Oct 2017 16:22 )    Color: Yellow / Appearance: Clear / S.005 / pH: x  Gluc: x / Ketone: Negative  / Bili: Negative / Urobili: Negative   Blood: x / Protein: Negative / Nitrite: Negative   Leuk Esterase: Negative / RBC: x / WBC x   Sq Epi: x / Non Sq Epi: x / Bacteria: x      Assessment and Plan:   · Assessment		  58 y/o male pt with a significant PMHx of HTN, anemia, BPH and no significant PSHx referred to ED by pmd for lower abdominal pain since 3 days.  Pt described pain as dull and aching in nature, non radiating, increased with the movement.  The pain was getting worse over the last 3 days.  Patient also started to have back pain since yesterday and he was having difficulty in passing urine, but denied any burning, or dysuria during micturition.  Patient also noted swelling of his legs.  Patient had h/o BPH for 3 years, and he is taking Flomax daily. Pt stated he has not seen urologist in over a year and noted that he does not have a urologist at this time.  Patient had colonoscopy Dec 2106 which showed hemorrhoids.  Pt denied fever, chills, nausea, vomiting, weakness, numbness, saddle anesthesia since symptom onset, or any other complaints.        Problem/Plan - 1:  ·  Problem: Urinary retention.  Plan: Urinary retention, likely due to BPH-stable ,gradually improving  - Dean placed in ED with adequate clear, yellow urine output  - UA negative for Blood or infection  - PSA pending  - Pt on Flomax  - Dr. Morgan, urology, consulted, and recommended to  followup on PSA, monitor creatinine, and to continue with   dean and flomax.      Problem/Plan - 2:  ·  Problem: HTN (hypertension).  Plan: - c/w atenolol home dose  - Blood pressure monitored  - DASH diet.      Problem/Plan - 3:  ·  Problem: Hyponatremia.-improved clinically  - patient asymptomatic   - Normal saline 100 ml/hr/bmp's monitored       Problem/Plan - 4:  ·  Problem: CIPRIANO (acute kidney injury).  -stable ,improving  - Dr. Brand, nephro, consulted and noted increased diuresis post  obstruction due to acquired tubular dysfunction.  She started pt on  1/2 NS at 75 ml/hr, ordered daily BMP's, 2 gm sodium diet.  - Dr. Morgan, urology, consulted.      Problem/Plan - 5:  ·  Problem: Anemia.  Plan: Microcytic Anemia.    - Hb 11.0 HCT 76.8  - guiac pending  - Iron saturation and total iron decreased and pt  supplemented with ferrous sulfate  - folate and Vit B12 normal.      Problem/Plan - 6:  Problem: Prophylactic measure. Plan: - no indication for DVT or gi prophylaxis.

## 2017-10-14 NOTE — PROGRESS NOTE ADULT - ASSESSMENT
Post renal CIPRIANO - bladder neck obstruction with BPH.  Non oliguric  BUN /creatinine improved.  Increased diuresis post obstruction due to acquired tubular dysfunction.    Will continue IV FLUIDS 1/2 ns rate 75 lm/minute  Daily BMP.  2 gm sodium diet.  Urology followup.

## 2017-10-15 LAB
ANION GAP SERPL CALC-SCNC: 9 MMOL/L — SIGNIFICANT CHANGE UP (ref 5–17)
BASOPHILS # BLD AUTO: 0 K/UL — SIGNIFICANT CHANGE UP (ref 0–0.2)
BASOPHILS NFR BLD AUTO: 0.6 % — SIGNIFICANT CHANGE UP (ref 0–2)
BUN SERPL-MCNC: 8 MG/DL — SIGNIFICANT CHANGE UP (ref 7–18)
CALCIUM SERPL-MCNC: 8.2 MG/DL — LOW (ref 8.4–10.5)
CHLORIDE SERPL-SCNC: 108 MMOL/L — SIGNIFICANT CHANGE UP (ref 96–108)
CO2 SERPL-SCNC: 26 MMOL/L — SIGNIFICANT CHANGE UP (ref 22–31)
CREAT SERPL-MCNC: 1.38 MG/DL — HIGH (ref 0.5–1.3)
EOSINOPHIL # BLD AUTO: 0.2 K/UL — SIGNIFICANT CHANGE UP (ref 0–0.5)
EOSINOPHIL NFR BLD AUTO: 2.5 % — SIGNIFICANT CHANGE UP (ref 0–6)
GLUCOSE SERPL-MCNC: 99 MG/DL — SIGNIFICANT CHANGE UP (ref 70–99)
HCT VFR BLD CALC: 33.5 % — LOW (ref 39–50)
HGB BLD-MCNC: 10.4 G/DL — LOW (ref 13–17)
LYMPHOCYTES # BLD AUTO: 1.7 K/UL — SIGNIFICANT CHANGE UP (ref 1–3.3)
LYMPHOCYTES # BLD AUTO: 28.2 % — SIGNIFICANT CHANGE UP (ref 13–44)
MCHC RBC-ENTMCNC: 24.2 PG — LOW (ref 27–34)
MCHC RBC-ENTMCNC: 31.1 GM/DL — LOW (ref 32–36)
MCV RBC AUTO: 77.8 FL — LOW (ref 80–100)
MONOCYTES # BLD AUTO: 0.6 K/UL — SIGNIFICANT CHANGE UP (ref 0–0.9)
MONOCYTES NFR BLD AUTO: 9.5 % — SIGNIFICANT CHANGE UP (ref 2–14)
NEUTROPHILS # BLD AUTO: 3.7 K/UL — SIGNIFICANT CHANGE UP (ref 1.8–7.4)
NEUTROPHILS NFR BLD AUTO: 59.3 % — SIGNIFICANT CHANGE UP (ref 43–77)
OB PNL STL: NEGATIVE — SIGNIFICANT CHANGE UP
PLATELET # BLD AUTO: 188 K/UL — SIGNIFICANT CHANGE UP (ref 150–400)
POTASSIUM SERPL-MCNC: 3.8 MMOL/L — SIGNIFICANT CHANGE UP (ref 3.5–5.3)
POTASSIUM SERPL-SCNC: 3.8 MMOL/L — SIGNIFICANT CHANGE UP (ref 3.5–5.3)
RBC # BLD: 4.3 M/UL — SIGNIFICANT CHANGE UP (ref 4.2–5.8)
RBC # FLD: 16.3 % — HIGH (ref 10.3–14.5)
SODIUM SERPL-SCNC: 143 MMOL/L — SIGNIFICANT CHANGE UP (ref 135–145)
WBC # BLD: 6.2 K/UL — SIGNIFICANT CHANGE UP (ref 3.8–10.5)
WBC # FLD AUTO: 6.2 K/UL — SIGNIFICANT CHANGE UP (ref 3.8–10.5)

## 2017-10-15 RX ORDER — ATENOLOL 25 MG/1
25 TABLET ORAL ONCE
Qty: 0 | Refills: 0 | Status: COMPLETED | OUTPATIENT
Start: 2017-10-15 | End: 2017-10-15

## 2017-10-15 RX ADMIN — ATENOLOL 25 MILLIGRAM(S): 25 TABLET ORAL at 22:50

## 2017-10-15 RX ADMIN — Medication 325 MILLIGRAM(S): at 17:30

## 2017-10-15 RX ADMIN — TAMSULOSIN HYDROCHLORIDE 0.4 MILLIGRAM(S): 0.4 CAPSULE ORAL at 22:50

## 2017-10-15 RX ADMIN — SODIUM CHLORIDE 75 MILLILITER(S): 9 INJECTION, SOLUTION INTRAVENOUS at 22:50

## 2017-10-15 RX ADMIN — Medication 500 MILLIGRAM(S): at 11:28

## 2017-10-15 RX ADMIN — Medication 325 MILLIGRAM(S): at 07:51

## 2017-10-15 RX ADMIN — ATENOLOL 50 MILLIGRAM(S): 25 TABLET ORAL at 05:21

## 2017-10-15 NOTE — DIETITIAN INITIAL EVALUATION ADULT. - PROBLEM SELECTOR PLAN 3
patient Hb 11.0 HCT 76.8  - patient having Microcytic anemia   - f/u Iron studies  - F/u folate and Vit B12  - will replaced with iron

## 2017-10-15 NOTE — DIETITIAN INITIAL EVALUATION ADULT. - OTHER INFO
nutrition consult requested for assessment; lives home PTA; skin intact; denied GI distress, chewing or swallowing problem at present, food choices obtained; eating better now; Pt not interested in diet education/nutrition information at present

## 2017-10-15 NOTE — CHART NOTE - NSCHARTNOTEFT_GEN_A_CORE
Re- consulted urology today as per Dr Parada for now recommends to continue Diaz    To re-consult primary urology team 10/16/17 Re- consulted urology today as per Dr Parada request. Covering urologist recommends to continue Diaz for now.    To re-consult primary urology team 10/16/17

## 2017-10-15 NOTE — DIETITIAN INITIAL EVALUATION ADULT. - PROBLEM SELECTOR PLAN 2
patient denies any h/o of renal disease, as per patient he had normal renal functions  - current Cr 3.12,  BUn 21  - BUN/Cr 6  - UA negative   - likely due to obstructive nephropathy   - foleys catheter placed, patient having good urine output   - f/u CT abdomen and Plevis  - f/u urine lytes  - Nephro consult Dr Brand   - urology consulted Cathy Foley

## 2017-10-15 NOTE — DIETITIAN INITIAL EVALUATION ADULT. - MD RECOMMEND
Ensure Enlive  1can ( 240ml ) x bid ( 700 kcal, 40 g protein) as needed if persistent poor oral intake

## 2017-10-15 NOTE — PROGRESS NOTE ADULT - SUBJECTIVE AND OBJECTIVE BOX
INTERVAL HPI/OVERNIGHT EVENTS:no acute events doing better,afebrile    VITAL SIGNS:  T(F): 98.3 (10-15-17 @ 09:34)  HR: 74 (10-15-17 @ 09:34)  BP: 144/80 (10-15-17 @ 09:34)  RR: 16 (10-15-17 @ 09:34)  SpO2: 100% (10-15-17 @ 09:34)  Wt(kg): --    PHYSICAL EXAM:    Constitutional:awaqke  Eyes:  ENMT:perrla  Neck:  Respiratory:clear  Cardiovascular:s1 2,m-none  Gastrointestinal:soft,non-tender,dean with clear urine  Extremities:no focal deficit  Vascular:  Neurological:  Musculoskeletal:    MEDICATIONS  (STANDING):  ascorbic acid 500 milliGRAM(s) Oral daily  ATENolol  Tablet 50 milliGRAM(s) Oral daily  ferrous    sulfate 325 milliGRAM(s) Oral two times a day with meals  sodium chloride 0.45%. 1000 milliLiter(s) (75 mL/Hr) IV Continuous <Continuous>  tamsulosin 0.4 milliGRAM(s) Oral at bedtime    MEDICATIONS  (PRN):      Allergies    No Known Allergies    Intolerances        LABS:                        10.4   6.2   )-----------( 188      ( 15 Oct 2017 07:12 )             33.5     10-15    143  |  108  |  8   ----------------------------<  99  3.8   |  26  |  1.38<H>    Ca    8.2<L>      15 Oct 2017 07:12    · Assessment		  58 y/o male pt with a significant PMHx of HTN, anemia, BPH and no significant PSHx referred to ED by pmd for lower abdominal pain since 3 days.  Pt described pain as dull and aching in nature, non radiating, increased with the movement.  The pain was getting worse over the last 3 days.  Patient also started to have back pain since yesterday and he was having difficulty in passing urine, but denied any burning, or dysuria during micturition.  Patient also noted swelling of his legs.  Patient had h/o BPH for 3 years, and he is taking Flomax daily. Pt stated he has not seen urologist in over a year and noted that he does not have a urologist at this time.  Patient had colonoscopy Dec 2106 which showed hemorrhoids.  Pt denied fever, chills, nausea, vomiting, weakness, numbness, saddle anesthesia since symptom onset, or any other complaints.        Problem/Plan - 1:  ·  Problem: Urinary retention.  Plan: Urinary retention, likely due to BPH-stable ,gradually improving  - Dean placed in ED with adequate clear, yellow urine output  - Pt on Flomax  - Dr. Morgan, urology, consulted, and recommended to  followup on PSA, monitor creatinine, and to continue with   dean and flomax.   -will consider to d/c dean prior to d/c community     Problem/Plan - 2:  ·  Problem: HTN (hypertension).  Plan: - c/w atenolol home dose  - Blood pressure monitored  - DASH diet.      Problem/Plan - 3:  ·  Problem: Hyponatremia.-improved clinically  - patient asymptomatic   - Normal saline 100 ml/hr/bmp's monitored       Problem/Plan - 4:  ·  Problem: CIPRIANO (acute kidney injury).  -stable ,improving  - Dr. Brand  f/up appretiated     Problem/Plan - 5:  ·  Problem: Anemia.  Plan: Microcytic Anemia.    - Hb 11.0 HCT 76.8  - guiac pending  - Iron saturation and total iron decreased and pt  supplemented with ferrous sulfate  - folate and Vit B12 normal.      Problem/Plan - 6:  Problem: Prophylactic measure. Plan: - no indication for DVT or gi prophylaxis.  -oob to chair

## 2017-10-16 VITALS
DIASTOLIC BLOOD PRESSURE: 86 MMHG | HEART RATE: 63 BPM | SYSTOLIC BLOOD PRESSURE: 151 MMHG | OXYGEN SATURATION: 100 % | TEMPERATURE: 97 F | RESPIRATION RATE: 16 BRPM

## 2017-10-16 LAB
ANION GAP SERPL CALC-SCNC: 10 MMOL/L — SIGNIFICANT CHANGE UP (ref 5–17)
BASOPHILS # BLD AUTO: 0 K/UL — SIGNIFICANT CHANGE UP (ref 0–0.2)
BASOPHILS NFR BLD AUTO: 0.7 % — SIGNIFICANT CHANGE UP (ref 0–2)
BUN SERPL-MCNC: 8 MG/DL — SIGNIFICANT CHANGE UP (ref 7–18)
CALCIUM SERPL-MCNC: 8.3 MG/DL — LOW (ref 8.4–10.5)
CHLORIDE SERPL-SCNC: 108 MMOL/L — SIGNIFICANT CHANGE UP (ref 96–108)
CO2 SERPL-SCNC: 24 MMOL/L — SIGNIFICANT CHANGE UP (ref 22–31)
CREAT SERPL-MCNC: 1.27 MG/DL — SIGNIFICANT CHANGE UP (ref 0.5–1.3)
EOSINOPHIL # BLD AUTO: 0.2 K/UL — SIGNIFICANT CHANGE UP (ref 0–0.5)
EOSINOPHIL NFR BLD AUTO: 3.9 % — SIGNIFICANT CHANGE UP (ref 0–6)
GLUCOSE BLDC GLUCOMTR-MCNC: 131 MG/DL — HIGH (ref 70–99)
GLUCOSE SERPL-MCNC: 94 MG/DL — SIGNIFICANT CHANGE UP (ref 70–99)
HCT VFR BLD CALC: 32.4 % — LOW (ref 39–50)
HGB BLD-MCNC: 10.1 G/DL — LOW (ref 13–17)
LYMPHOCYTES # BLD AUTO: 1.7 K/UL — SIGNIFICANT CHANGE UP (ref 1–3.3)
LYMPHOCYTES # BLD AUTO: 34.1 % — SIGNIFICANT CHANGE UP (ref 13–44)
MCHC RBC-ENTMCNC: 24.2 PG — LOW (ref 27–34)
MCHC RBC-ENTMCNC: 31.1 GM/DL — LOW (ref 32–36)
MCV RBC AUTO: 77.8 FL — LOW (ref 80–100)
MONOCYTES # BLD AUTO: 0.4 K/UL — SIGNIFICANT CHANGE UP (ref 0–0.9)
MONOCYTES NFR BLD AUTO: 8.8 % — SIGNIFICANT CHANGE UP (ref 2–14)
NEUTROPHILS # BLD AUTO: 2.6 K/UL — SIGNIFICANT CHANGE UP (ref 1.8–7.4)
NEUTROPHILS NFR BLD AUTO: 52.6 % — SIGNIFICANT CHANGE UP (ref 43–77)
PLATELET # BLD AUTO: 197 K/UL — SIGNIFICANT CHANGE UP (ref 150–400)
POTASSIUM SERPL-MCNC: 3.5 MMOL/L — SIGNIFICANT CHANGE UP (ref 3.5–5.3)
POTASSIUM SERPL-SCNC: 3.5 MMOL/L — SIGNIFICANT CHANGE UP (ref 3.5–5.3)
RBC # BLD: 4.17 M/UL — LOW (ref 4.2–5.8)
RBC # FLD: 16.2 % — HIGH (ref 10.3–14.5)
SODIUM SERPL-SCNC: 142 MMOL/L — SIGNIFICANT CHANGE UP (ref 135–145)
WBC # BLD: 4.9 K/UL — SIGNIFICANT CHANGE UP (ref 3.8–10.5)
WBC # FLD AUTO: 4.9 K/UL — SIGNIFICANT CHANGE UP (ref 3.8–10.5)

## 2017-10-16 PROCEDURE — 87389 HIV-1 AG W/HIV-1&-2 AB AG IA: CPT

## 2017-10-16 PROCEDURE — 82962 GLUCOSE BLOOD TEST: CPT

## 2017-10-16 PROCEDURE — 82728 ASSAY OF FERRITIN: CPT

## 2017-10-16 PROCEDURE — 84443 ASSAY THYROID STIM HORMONE: CPT

## 2017-10-16 PROCEDURE — 82607 VITAMIN B-12: CPT

## 2017-10-16 PROCEDURE — 83036 HEMOGLOBIN GLYCOSYLATED A1C: CPT

## 2017-10-16 PROCEDURE — 84466 ASSAY OF TRANSFERRIN: CPT

## 2017-10-16 PROCEDURE — 82746 ASSAY OF FOLIC ACID SERUM: CPT

## 2017-10-16 PROCEDURE — 82272 OCCULT BLD FECES 1-3 TESTS: CPT

## 2017-10-16 PROCEDURE — 83550 IRON BINDING TEST: CPT

## 2017-10-16 PROCEDURE — 81003 URINALYSIS AUTO W/O SCOPE: CPT

## 2017-10-16 PROCEDURE — 84300 ASSAY OF URINE SODIUM: CPT

## 2017-10-16 PROCEDURE — 83735 ASSAY OF MAGNESIUM: CPT

## 2017-10-16 PROCEDURE — 84156 ASSAY OF PROTEIN URINE: CPT

## 2017-10-16 PROCEDURE — 86803 HEPATITIS C AB TEST: CPT

## 2017-10-16 PROCEDURE — 84100 ASSAY OF PHOSPHORUS: CPT

## 2017-10-16 PROCEDURE — 82570 ASSAY OF URINE CREATININE: CPT

## 2017-10-16 PROCEDURE — G0103: CPT

## 2017-10-16 PROCEDURE — 80053 COMPREHEN METABOLIC PANEL: CPT

## 2017-10-16 PROCEDURE — 85027 COMPLETE CBC AUTOMATED: CPT

## 2017-10-16 PROCEDURE — 74176 CT ABD & PELVIS W/O CONTRAST: CPT

## 2017-10-16 PROCEDURE — 80048 BASIC METABOLIC PNL TOTAL CA: CPT

## 2017-10-16 PROCEDURE — 99285 EMERGENCY DEPT VISIT HI MDM: CPT | Mod: 25

## 2017-10-16 PROCEDURE — 82436 ASSAY OF URINE CHLORIDE: CPT

## 2017-10-16 PROCEDURE — 82306 VITAMIN D 25 HYDROXY: CPT

## 2017-10-16 PROCEDURE — 80061 LIPID PANEL: CPT

## 2017-10-16 PROCEDURE — 83935 ASSAY OF URINE OSMOLALITY: CPT

## 2017-10-16 RX ORDER — FERROUS SULFATE 325(65) MG
1 TABLET ORAL
Qty: 0 | Refills: 0 | COMMUNITY
Start: 2017-10-16

## 2017-10-16 RX ORDER — ASCORBIC ACID 60 MG
1 TABLET,CHEWABLE ORAL
Qty: 0 | Refills: 0 | COMMUNITY
Start: 2017-10-16

## 2017-10-16 RX ORDER — ATENOLOL 25 MG/1
1 TABLET ORAL
Qty: 30 | Refills: 0 | OUTPATIENT
Start: 2017-10-16 | End: 2017-11-15

## 2017-10-16 RX ORDER — TAMSULOSIN HYDROCHLORIDE 0.4 MG/1
1 CAPSULE ORAL
Qty: 30 | Refills: 0 | OUTPATIENT
Start: 2017-10-16 | End: 2017-11-15

## 2017-10-16 RX ADMIN — Medication 325 MILLIGRAM(S): at 08:11

## 2017-10-16 RX ADMIN — Medication 325 MILLIGRAM(S): at 17:53

## 2017-10-16 RX ADMIN — ATENOLOL 50 MILLIGRAM(S): 25 TABLET ORAL at 05:37

## 2017-10-16 RX ADMIN — Medication 500 MILLIGRAM(S): at 12:31

## 2017-10-16 NOTE — DISCHARGE NOTE ADULT - SECONDARY DIAGNOSIS.
CIPRIANO (acute kidney injury) Anemia BPH (benign prostatic hyperplasia) Hyponatremia HTN (hypertension)

## 2017-10-16 NOTE — DISCHARGE NOTE ADULT - MEDICATION SUMMARY - MEDICATIONS TO TAKE
I will START or STAY ON the medications listed below when I get home from the hospital:    tamsulosin 0.4 mg oral capsule  -- 1 cap(s) by mouth once a day (at bedtime)  -- Indication: For BPH (benign prostatic hyperplasia)    atenolol 50 mg oral tablet  -- 1 tab(s) by mouth once a day  -- Indication: For HTN (hypertension)    ferrous sulfate 325 mg (65 mg elemental iron) oral tablet  -- 1 tab(s) by mouth 2 times a day (after meals)  -- Indication: For Anemia    ascorbic acid 500 mg oral tablet  -- 1 tab(s) by mouth once a day  -- Indication: For Anemia

## 2017-10-16 NOTE — DISCHARGE NOTE ADULT - HOSPITAL COURSE
56 y/o male pt with a significant PMHx of HTN, anemia, BPH and no significant PSHx referred to ED by pmd for lower abdominal pain X 3 days.  Pt described pain as dull and aching in nature, non radiating, increased with movement.  The pain was getting worse over the last 3 days.  Patient also started to have back pain since yesterday and he was having difficulty in passing urine, but denied any burning, or dysuria during micturition.  Patient also noted swelling of his legs.  Patient has h/o BPH for 3 years, and he is taking Flomax daily. Pt stated he has not seen urologist in over a year and noted that he does not have a urologist at this time.  Patient had colonoscopy Dec 2106 which showed hemorrhoids.  Pt denied fever, chills, nausea, vomiting, weakness, numbness, saddle anesthesia since symptom onset, or any other complaints. Patient was admitted to the medical floor for urinary retention.      Urinary retention, likely due to BPH  - Dean placed in ED with adequate clear, yellow urine output  - UA negative for Blood or infection  - PSA normal at 1.44  - Pt on Flomax and needs total of seven days to take effect (dean can be discontinued on 10/19).   - Dr. Morgan, urology, consulted,     Post renal CIPRIANO - bladder neck obstruction with BPH  Patient denied any history of renal disease, and has normal renal function  at baseline.  - current Cr 3.12/BUN 21  - UA negative   - CT abdomen and Pelvis mild bilateral hydroureteronephrosis with marked diffuse urinary bladder wall thickening. Urinary bladder is incompletely collapsed around a Dean catheter.   - Dr. Brand, nephro, consulted and noted increased diuresis post  obstruction due to acquired tubular dysfunction.  She started pt on  1/2 NS at 75 ml/hr, ordered daily BMP's, 2 gm sodium diet.  - Dr. Morgan, urology, consulted  - Resolved    Microcytic Anemia.    - Hb 11.0 HCT 76.8  - guiac negative  - Iron saturation and total iron decreased and pt  supplemented with ferrous sulfate  - folate and Vit B12 normal    HTN (hypertension).   - c/w atenolol home dose  - Blood pressure monitored  - DASH diet    Prophylactic measure.    - no indication for DVT or gi prophylaxis.     Hyponatremia.   - Na 132  - patient asymptomatic   - Normal saline 100 ml/hr/bmp's monitored  - Resolved    Attending cleared patient for discharge home with dean cath and followup with Dr. Morgan, urologist, for voiding trial in three days. 58 y/o male pt with a significant PMHx of HTN, anemia, BPH and no significant PSHx referred to ED by pmd for lower abdominal pain X 3 days.  Pt described pain as dull and aching in nature, non radiating, increased with movement.  The pain was getting worse over the last 3 days.  Patient also started to have back pain since yesterday and he was having difficulty in passing urine, but denied any burning, or dysuria during micturition.  Patient also noted swelling of his legs.  Patient has h/o BPH for 3 years, and he is taking Flomax daily. Pt stated he has not seen urologist in over a year and noted that he does not have a urologist at this time.  Patient had colonoscopy Dec 2106 which showed hemorrhoids.  Pt denied fever, chills, nausea, vomiting, weakness, numbness, saddle anesthesia since symptom onset, or any other complaints. Patient was admitted to the medical floor for urinary retention.      Urinary retention, likely due to BPH  - Diaz placed in ED with adequate clear, yellow urine output  - UA negative for Blood or infection  - PSA normal at 1.44  - Pt on Flomax.   - Dr. Morgan, urology, consulted,     Post renal CIPRIANO - bladder neck obstruction with BPH  Patient denied any history of renal disease, and has normal renal function  at baseline.  - current Cr 3.12/BUN 21  - UA negative   - CT abdomen and Pelvis mild bilateral hydroureteronephrosis with marked diffuse urinary bladder wall thickening. Urinary bladder is incompletely collapsed around a Diaz catheter.   - Dr. Brand, nephro, consulted and noted increased diuresis post  obstruction due to acquired tubular dysfunction.  She started pt on  1/2 NS at 75 ml/hr, ordered daily BMP's, 2 gm sodium diet.  - Dr. Morgan, urology, consulted  - Resolved    Microcytic Anemia.    - Hb 11.0 HCT 76.8  - guiac negative  - Iron saturation and total iron decreased and pt  supplemented with ferrous sulfate  - folate and Vit B12 normal    HTN (hypertension).   - c/w atenolol home dose  - Blood pressure monitored  - DASH diet    Prophylactic measure.    - no indication for DVT or gi prophylaxis.     Hyponatremia.   - Na 132  - patient asymptomatic   - Normal saline 100 ml/hr/bmp's monitored  - Resolved    Attending cleared patient for discharge home with Flomax and followup with Dr. Morgan, urologist.

## 2017-10-16 NOTE — DISCHARGE NOTE ADULT - ADDITIONAL INSTRUCTIONS
Continue with Flomax, as prescribed, as well as dean.  You have been Continue with Flomax, as prescribed.  Followup with Dr. Morgan, urologist in three days.

## 2017-10-16 NOTE — DISCHARGE NOTE ADULT - PATIENT PORTAL LINK FT
“You can access the [facility] Patient Portal, offered by [facility], by registering with the following website: [email or web address]”

## 2017-10-16 NOTE — DISCHARGE NOTE ADULT - CARE PROVIDER_API CALL
Dany Morgan), Urology  [address] [place] [address]  Phone: [phone]  Fax: [phone] Dany Morgan), Urology  [address] [place] [address]  Phone: [phone]  Fax: [phone]    MD Katlin, [facility]  [address]  [place] [address]  Phone: (   )    -  Fax: (   )    -

## 2017-10-16 NOTE — DISCHARGE NOTE ADULT - CARE PLAN
Principal Discharge DX:	Urinary retention  Goal:	resolution  Instructions for follow-up, activity and diet:	Continue with Flomax and dean and followup with Dr. Morgan, urologist in three days.  Secondary Diagnosis:	CIPRIANO (acute kidney injury)  Goal:	resolved  Instructions for follow-up, activity and diet:	Followup with PCP, Warner Dalal MD in two to three days.  Secondary Diagnosis:	Anemia  Goal:	appropriate management  Instructions for follow-up, activity and diet:	Continue with Ferrous Sulfate, as ordered, and followup with Dr. Warner Dalal MD, in two to three days.  Secondary Diagnosis:	BPH (benign prostatic hyperplasia)  Goal:	appropriate management  Instructions for follow-up, activity and diet:	Continue with daily Flomax, as prescribed, and followup with Dr. Morgan, urologist in three days for voiding trial  Secondary Diagnosis:	Hyponatremia  Secondary Diagnosis:	HTN (hypertension) Principal Discharge DX:	Urinary retention  Goal:	resolution  Instructions for follow-up, activity and diet:	Continue with Flomax and followup with Dr. Morgan, urologist in three days.  Secondary Diagnosis:	CIPRIANO (acute kidney injury)  Goal:	resolved  Instructions for follow-up, activity and diet:	Followup with PCP, Warner Dalal MD in two to three days.  Secondary Diagnosis:	Anemia  Goal:	appropriate management  Instructions for follow-up, activity and diet:	Continue with Ferrous Sulfate, as ordered, and followup with Dr. Warner Dalal MD, in two to three days.  Secondary Diagnosis:	BPH (benign prostatic hyperplasia)  Goal:	appropriate management  Instructions for follow-up, activity and diet:	Continue with daily Flomax, as prescribed, and followup with Dr. Morgan, urologist in three days  Secondary Diagnosis:	Hyponatremia  Goal:	resolved  Instructions for follow-up, activity and diet:	resolved  Secondary Diagnosis:	HTN (hypertension)  Goal:	<140/90  Instructions for follow-up, activity and diet:	Continue with antihypertensive medication, healthy lifestyle interventions, including diet and exercise, and followup with you PMD in two to three days.

## 2017-10-16 NOTE — PROGRESS NOTE ADULT - SUBJECTIVE AND OBJECTIVE BOX
INTERVAL HPI/OVERNIGHT EVENTS:no acute events,stable cinically and hemodinamicly    VITAL SIGNS:  T(F): 97.3 (10-16-17 @ 13:45)  HR: 63 (10-16-17 @ 13:45)  BP: 151/86 (10-16-17 @ 13:45)  RR: 16 (10-16-17 @ 13:45)  SpO2: 100% (10-16-17 @ 13:45)  Wt(kg): --    PHYSICAL EXAM:awake,alert    Constitutional:  Eyes:  ENMT:perrla  Neck:  Respiratory:clear  Cardiovascular:s1 s2 ,m-none  Gastrointestinal:soft,non-tender  Extremities:  Vascular:  Neurological:no focal deficit  Musculoskeletal:    MEDICATIONS  (STANDING):  ascorbic acid 500 milliGRAM(s) Oral daily  ATENolol  Tablet 50 milliGRAM(s) Oral daily  ferrous    sulfate 325 milliGRAM(s) Oral two times a day with meals  sodium chloride 0.45%. 1000 milliLiter(s) (75 mL/Hr) IV Continuous <Continuous>  tamsulosin 0.4 milliGRAM(s) Oral at bedtime    MEDICATIONS  (PRN):      Allergies    No Known Allergies    Intolerances        LABS:                        10.1   4.9   )-----------( 197      ( 16 Oct 2017 07:24 )             32.4     10-16    142  |  108  |  8   ----------------------------<  94  3.5   |  24  |  1.27    Ca    8.3<L>      16 Oct 2017 07:24    · Assessment		  58 y/o male pt with a significant PMHx of HTN, anemia, BPH and no significant PSHx referred to ED by pmd for lower abdominal pain since 3 days.  Pt described pain as dull and aching in nature, non radiating, increased with the movement.  The pain was getting worse over the last 3 days.  Patient also started to have back pain since yesterday and he was having difficulty in passing urine, but denied any burning, or dysuria during micturition.  Patient also noted swelling of his legs.  Patient had h/o BPH for 3 years, and he is taking Flomax daily. Pt stated he has not seen urologist in over a year and noted that he does not have a urologist at this time.  Patient had colonoscopy Dec 2106 which showed hemorrhoids.  Pt denied fever, chills, nausea, vomiting, weakness, numbness, saddle anesthesia since symptom onset, or any other complaints.        Problem/Plan - 1:  ·  Problem: Urinary retention.  Plan: Urinary retention, likely due to BPH-stable ,gradually improving  - Diaz placed in ED with adequate clear, yellow urine output  - Pt on Flomax  - Dr. Morgan, urology -recom d/c withfoley and f/up in office     Problem/Plan - 2:  ·  Problem: HTN (hypertension).  Plan: - c/w atenolol home dose  - Blood pressure monitored  - DASH diet.      Problem/Plan - 3:  ·  Problem: Hyponatremia.-improved clinically  - patient asymptomatic   - Normal saline 100 ml/hr/bmp's monitored       Problem/Plan - 4:  ·  Problem: CIPRIANO (acute kidney injury)-resolved  - Dr. Brand  f/up appretiated     Problem/Plan - 5:  ·  Problem: Anemia.  Plan: Microcytic Anemia.    - Hb 11.0 HCT 76.8  - guiac pending  - Iron saturation and total iron decreased and pt  supplemented with ferrous sulfate  - folate and Vit B12 normal.      Problem/Plan - 6:  Problem: Prophylactic measure. Plan: - no indication for DVT or gi prophylaxis.  -oob to chair  -stablel for d/c

## 2017-10-16 NOTE — DISCHARGE NOTE ADULT - PROVIDER TOKENS
TOKEN:'[id number]' TOKEN:'[id number]',FREE:[LAST:[MD Katlin],FIRST:[Warner],PHONE:[(   )    -],FAX:[(   )    -],ADDRESS:[[address], [address] [place] [id number]]]

## 2017-10-16 NOTE — DISCHARGE NOTE ADULT - PLAN OF CARE
resolution Continue with Flomax and dean and followup with Dr. Morgan, urologist in three days. resolved Followup with PCP, Warner Dalal MD in two to three days. appropriate management Continue with Ferrous Sulfate, as ordered, and followup with Dr. Warner Dalal MD, in two to three days. Continue with daily Flomax, as prescribed, and followup with Dr. Morgan, urologist in three days for voiding trial Continue with Flomax and followup with Dr. Morgan, urologist in three days. Continue with daily Flomax, as prescribed, and followup with Dr. Morgan, urologist in three days <140/90 Continue with antihypertensive medication, healthy lifestyle interventions, including diet and exercise, and followup with you PMD in two to three days.

## 2017-10-17 ENCOUNTER — EMERGENCY (EMERGENCY)
Facility: HOSPITAL | Age: 57
LOS: 1 days | Discharge: HOME | End: 2017-10-17
Attending: EMERGENCY MEDICINE
Payer: COMMERCIAL

## 2017-10-17 VITALS
TEMPERATURE: 98 F | RESPIRATION RATE: 18 BRPM | SYSTOLIC BLOOD PRESSURE: 175 MMHG | DIASTOLIC BLOOD PRESSURE: 89 MMHG | WEIGHT: 134.92 LBS | HEIGHT: 65 IN | HEART RATE: 81 BPM | OXYGEN SATURATION: 100 %

## 2017-10-17 DIAGNOSIS — N40.1 BENIGN PROSTATIC HYPERPLASIA WITH LOWER URINARY TRACT SYMPTOMS: ICD-10-CM

## 2017-10-17 DIAGNOSIS — I10 ESSENTIAL (PRIMARY) HYPERTENSION: ICD-10-CM

## 2017-10-17 DIAGNOSIS — D64.9 ANEMIA, UNSPECIFIED: ICD-10-CM

## 2017-10-17 DIAGNOSIS — R33.8 OTHER RETENTION OF URINE: ICD-10-CM

## 2017-10-17 LAB
APPEARANCE UR: CLEAR — SIGNIFICANT CHANGE UP
BILIRUB UR-MCNC: NEGATIVE — SIGNIFICANT CHANGE UP
COLOR SPEC: YELLOW — SIGNIFICANT CHANGE UP
DIFF PNL FLD: NEGATIVE — SIGNIFICANT CHANGE UP
GLUCOSE UR QL: NEGATIVE — SIGNIFICANT CHANGE UP
KETONES UR-MCNC: NEGATIVE — SIGNIFICANT CHANGE UP
LEUKOCYTE ESTERASE UR-ACNC: NEGATIVE — SIGNIFICANT CHANGE UP
NITRITE UR-MCNC: NEGATIVE — SIGNIFICANT CHANGE UP
PH UR: 6.5 — SIGNIFICANT CHANGE UP (ref 5–8)
PROT UR-MCNC: NEGATIVE — SIGNIFICANT CHANGE UP
SP GR SPEC: 1 — LOW (ref 1.01–1.02)
UROBILINOGEN FLD QL: NEGATIVE — SIGNIFICANT CHANGE UP

## 2017-10-17 PROCEDURE — 51702 INSERT TEMP BLADDER CATH: CPT

## 2017-10-17 PROCEDURE — 99284 EMERGENCY DEPT VISIT MOD MDM: CPT | Mod: 25

## 2017-10-17 PROCEDURE — 99283 EMERGENCY DEPT VISIT LOW MDM: CPT | Mod: 25

## 2017-10-17 PROCEDURE — 81003 URINALYSIS AUTO W/O SCOPE: CPT

## 2017-10-17 PROCEDURE — 87086 URINE CULTURE/COLONY COUNT: CPT

## 2017-10-17 RX ORDER — CEFUROXIME AXETIL 250 MG
1 TABLET ORAL
Qty: 14 | Refills: 0 | OUTPATIENT
Start: 2017-10-17 | End: 2017-10-24

## 2017-10-17 RX ORDER — CEFUROXIME AXETIL 250 MG
250 TABLET ORAL ONCE
Qty: 0 | Refills: 0 | Status: COMPLETED | OUTPATIENT
Start: 2017-10-17 | End: 2017-10-17

## 2017-10-17 RX ADMIN — Medication 250 MILLIGRAM(S): at 02:14

## 2017-10-17 NOTE — ED PROVIDER NOTE - OBJECTIVE STATEMENT
59 y/o male with PMHx of HTN, BPH, and Anemia presents to the ED c/o urinary retention today. Pt was admitted to [facility] 4 days ago for urinary retention. He had a Diaz placed and then removed today at 2:30pm when he was discharged. Pt states since the Diaz catheter has been removed, he has been unable to urinate completely. Pt denies fever, chills, CP, SOB, abdominal pain, nausea, vomiting, or any other complaints. NKDA. PMD: Dr. Shelley Dalal.

## 2017-10-17 NOTE — ED PROVIDER NOTE - MEDICAL DECISION MAKING DETAILS
900 cc clear UO in collection bag. Pt feels better. Because pt had recent instrumentation to urethra I wwill rx Ceftin to prevent UTI. Pt is well appearing walking with normal gait, stable for discharge and follow up with medical doctor. Pt educated on care and need for follow up. Discussed anticipatory guidance and return precautions. Questions answered. I had a detailed discussion with the patient and/or guardian regarding the historical points, exam findings, and any diagnostic results supporting the discharge diagnosis.. Pt will continue with Flomax and f/u with urologist Dr. Morgan.

## 2017-10-18 LAB
CULTURE RESULTS: NO GROWTH — SIGNIFICANT CHANGE UP
SPECIMEN SOURCE: SIGNIFICANT CHANGE UP

## 2017-10-20 DIAGNOSIS — N40.1 BENIGN PROSTATIC HYPERPLASIA WITH LOWER URINARY TRACT SYMPTOMS: ICD-10-CM

## 2017-10-20 DIAGNOSIS — I10 ESSENTIAL (PRIMARY) HYPERTENSION: ICD-10-CM

## 2017-10-20 DIAGNOSIS — E87.1 HYPO-OSMOLALITY AND HYPONATREMIA: ICD-10-CM

## 2017-10-20 DIAGNOSIS — N13.2 HYDRONEPHROSIS WITH RENAL AND URETERAL CALCULOUS OBSTRUCTION: ICD-10-CM

## 2017-10-20 DIAGNOSIS — D64.9 ANEMIA, UNSPECIFIED: ICD-10-CM

## 2017-10-20 DIAGNOSIS — R33.8 OTHER RETENTION OF URINE: ICD-10-CM

## 2017-10-20 DIAGNOSIS — N17.9 ACUTE KIDNEY FAILURE, UNSPECIFIED: ICD-10-CM

## 2019-12-24 NOTE — ED ADULT NURSE NOTE - GASTROINTESTINAL WDL
Addended by: MAX CARR on: 12/24/2019 09:30 AM     Modules accepted: Orders     Abdomen soft, nontender, nondistended, bowel sounds present in all 4 quadrants.

## 2022-01-17 NOTE — ED ADULT NURSE NOTE - NS ED NURSE RECORD ANOTHER VITAL SIGN
Subjective: JOANNA ON. No complaints this morning. No changes in neuro-vasc exam.     ROS:   Denies Headache, blurred vision, Chest Pain, SOB, Abdominal pain, nausea or vomiting     Social   amLODIPine   Tablet 5  apixaban 5  clopidogrel Tablet 75  enalapril 10  tamsulosin 0.4      Allergies    No Known Allergies    Intolerances        Vital Signs Last 24 Hrs  T(C): 39.2 (17 Jan 2022 06:01), Max: 40 (16 Jan 2022 15:25)  T(F): 102.5 (17 Jan 2022 06:01), Max: 104 (16 Jan 2022 15:25)  HR: 68 (17 Jan 2022 05:12) (59 - 95)  BP: 120/57 (17 Jan 2022 05:12) (118/59 - 174/107)  BP(mean): 82 (17 Jan 2022 05:12) (82 - 105)  RR: 20 (17 Jan 2022 05:12) (16 - 20)  SpO2: 92% (17 Jan 2022 05:12) (92% - 99%)  I&O's Summary    15 Macario 2022 07:01  -  16 Jan 2022 07:00  --------------------------------------------------------  IN: 240 mL / OUT: 170 mL / NET: 70 mL    16 Jan 2022 07:01  -  17 Jan 2022 06:56  --------------------------------------------------------  IN: 1258 mL / OUT: 550 mL / NET: 708 mL        Physical Exam:  GENERAL: NAD, speaks in full sentences, no signs of respiratory distress  HEAD:  Atraumatic, Normocephalic  NECK: Supple, No JVD  CHEST/LUNG: nonlabored breathing  HEART: rate controlled Afib  ABDOMEN: Soft, Nontender, Nondistended  NEUROLOGY: AOx1-2 (not orientated to time and hospital name), able to shrug against resistance, stick tongue out and move side to side, raise eyebrows, strength 5/5 x4, sensation grossly intact.    LABS:                        11.8   7.28  )-----------( 112      ( 16 Jan 2022 06:57 )             36.1     01-16    135  |  102  |  32<H>  ----------------------------<  81  4.1   |  18<L>  |  1.35<H>    Ca    8.4      16 Jan 2022 06:57  Mg     2.5     01-16    TPro  6.0  /  Alb  3.1<L>  /  TBili  1.2  /  DBili  x   /  AST  145<H>  /  ALT  44  /  AlkPhos  45  01-16               Yes

## 2023-03-10 NOTE — ED ADULT NURSE NOTE - URINE CHARACTERISTICS
Quality 110: Preventive Care And Screening: Influenza Immunization: Influenza Immunization Administered during Influenza season Detail Level: Detailed Quality 130: Documentation Of Current Medications In The Medical Record: Current Medications Documented clear